# Patient Record
Sex: FEMALE | Race: BLACK OR AFRICAN AMERICAN | NOT HISPANIC OR LATINO | Employment: FULL TIME | URBAN - METROPOLITAN AREA
[De-identification: names, ages, dates, MRNs, and addresses within clinical notes are randomized per-mention and may not be internally consistent; named-entity substitution may affect disease eponyms.]

---

## 2019-12-31 ENCOUNTER — HOSPITAL ENCOUNTER (EMERGENCY)
Facility: HOSPITAL | Age: 29
Discharge: HOME | End: 2019-12-31
Attending: EMERGENCY MEDICINE
Payer: MEDICAID

## 2019-12-31 VITALS
DIASTOLIC BLOOD PRESSURE: 74 MMHG | TEMPERATURE: 98 F | HEART RATE: 82 BPM | HEIGHT: 64 IN | SYSTOLIC BLOOD PRESSURE: 120 MMHG | OXYGEN SATURATION: 100 % | BODY MASS INDEX: 28.04 KG/M2 | WEIGHT: 164.25 LBS | RESPIRATION RATE: 16 BRPM

## 2019-12-31 DIAGNOSIS — L50.9 URTICARIA: Primary | ICD-10-CM

## 2019-12-31 PROCEDURE — 99283 EMERGENCY DEPT VISIT LOW MDM: CPT

## 2019-12-31 RX ORDER — DIPHENHYDRAMINE HCL 25 MG
25 CAPSULE ORAL EVERY 6 HOURS PRN
Qty: 20 CAPSULE | Refills: 0 | Status: SHIPPED | OUTPATIENT
Start: 2019-12-31

## 2019-12-31 RX ORDER — PREDNISONE 50 MG/1
50 TABLET ORAL DAILY
Qty: 5 TABLET | Refills: 0 | Status: SHIPPED | OUTPATIENT
Start: 2019-12-31 | End: 2020-01-05

## 2019-12-31 NOTE — ED PROVIDER NOTES
SCRIBE #1 NOTE: I, Ana Laura Flanagan, am scribing for, and in the presence of, Shen Romero MD. I have scribed the entire note.      History      Chief Complaint   Patient presents with    Allergic Reaction     pt reports she wakes up with hives every morning, pt denies any allergies or changes in her food or soaps       Review of patient's allergies indicates:  No Known Allergies     HPI   HPI    12/31/2019, 12:40 PM   History obtained from the patient      History of Present Illness: Arti Aguilera is a 29 y.o. female patient who presents to the Emergency Department for evaluation of an Allergic Reaction. Patient reports waking up daily with hives on multiple areas of her body. Symptoms are constant and moderate in severity. Patient denies any allergies or changes in detergents/soaps. Patient denies any fever, N/V, trouble swallowing, facial swelling, wheezing, SOB, and all other sxs at this time. Prior Tx includes Benadryl. No further complaints or concerns at this time.       Arrival mode: Personal vehicle    PCP: Primary Doctor No       Past Medical History:  Past Medical History:   Diagnosis Date    Anemia      Past Surgical History:  History reviewed. No pertinent surgical history.    Family History:  Family History   Problem Relation Age of Onset    Hypertension Mother     Cancer Maternal Grandmother        Social History:  Social History     Tobacco Use    Smoking status: Never Smoker   Substance and Sexual Activity    Alcohol use: No    Drug use: No    Sexual activity: Yes     Partners: Male       ROS   Review of Systems   Constitutional: Negative for fever.   HENT: Negative for sore throat and trouble swallowing.    Respiratory: Negative for shortness of breath and wheezing.    Cardiovascular: Negative for chest pain.   Gastrointestinal: Negative for nausea and vomiting.   Genitourinary: Negative for dysuria.   Musculoskeletal: Negative for back pain.   Skin: Negative for rash.        (+)  "Urticaria    Neurological: Negative for weakness.   Hematological: Does not bruise/bleed easily.   All other systems reviewed and are negative.    Physical Exam      Initial Vitals [12/31/19 1238]   BP Pulse Resp Temp SpO2   120/74 82 16 98 °F (36.7 °C) 100 %      MAP       --          Physical Exam  Nursing Notes and Vital Signs Reviewed.  Constitutional: Patient is in no acute distress. Well-developed and well-nourished.  Head: Atraumatic. Normocephalic.  Eyes: PERRL. EOM intact. Conjunctivae are not pale. No scleral icterus.  ENT: Mucous membranes are moist.  Neck: Supple. Full ROM.  Cardiovascular: Regular rate. Regular rhythm. No murmurs, rubs, or gallops. Distal pulses are 2+ and symmetric.  Pulmonary/Chest: No respiratory distress. Clear to auscultation bilaterally. No wheezing or rales.  Abdominal: Soft and non-distended.  There is no tenderness.  Genitourinary: No CVA tenderness  Musculoskeletal: Moves all extremities. No obvious deformities.  Skin: Warm and dry. Diffuse urticaria.    Neurological:  Alert, awake, and appropriate.  Normal speech.  No acute focal neurological deficits are appreciated.  Psychiatric: Normal affect. Good eye contact. Appropriate in content.    ED Course    Procedures  ED Vital Signs:  Vitals:    12/31/19 1238   BP: 120/74   Pulse: 82   Resp: 16   Temp: 98 °F (36.7 °C)   TempSrc: Oral   SpO2: 100%   Weight: 74.5 kg (164 lb 3.9 oz)   Height: 5' 4" (1.626 m)       Abnormal Lab Results:  Labs Reviewed - No data to display     All Lab Results:  None    Imaging Results:  Imaging Results    None                 The Emergency Provider reviewed the vital signs and test results, which are outlined above.    ED Discussion     12:46 PM: Discussed with pt all pertinent ED information and results. Discussed pt dx and plan of tx. Gave pt all f/u and return to the ED instructions. All questions and concerns were addressed at this time. Pt expresses understanding of information and instructions, " and is comfortable with plan to discharge. Pt is stable for discharge.    I discussed with patient and/or family/caretaker that evaluation in the ED does not suggest any emergent or life threatening medical conditions requiring immediate intervention beyond what was provided in the ED, and I believe patient is safe for discharge.  Regardless, an unremarkable evaluation in the ED does not preclude the development or presence of a serious of life threatening condition. As such, patient was instructed to return immediately for any worsening or change in current symptoms.      ED Medication(s):  Medications - No data to display  Current Discharge Medication List      START taking these medications    Details   diphenhydrAMINE (BENADRYL) 25 mg capsule Take 1 capsule (25 mg total) by mouth every 6 (six) hours as needed for Itching.  Qty: 20 capsule, Refills: 0      predniSONE (DELTASONE) 50 MG Tab Take 1 tablet (50 mg total) by mouth once daily. for 5 days  Qty: 5 tablet, Refills: 0             Follow-up Information     [facility] In 2 days.    Contact information:  [address] [facility] [address] [phone]                     Medical Decision Making              Scribe Attestation:   Scribe #1: I performed the above scribed service and the documentation accurately describes the services I performed. I attest to the accuracy of the note.    Attending:   Physician Attestation Statement for Scribe #1: I, Shen Romero MD, personally performed the services described in this documentation, as scribed by Ana Laura Flanagan, in my presence, and it is both accurate and complete.          Clinical Impression       ICD-10-CM ICD-9-CM   1. Urticaria L50.9 708.9       Disposition:   Disposition: Discharged  Condition: Stable         Shen Romero MD  12/31/19 6232

## 2020-01-07 ENCOUNTER — HOSPITAL ENCOUNTER (EMERGENCY)
Facility: HOSPITAL | Age: 30
Discharge: HOME | End: 2020-01-07
Attending: EMERGENCY MEDICINE
Payer: MEDICAID

## 2020-01-07 VITALS
TEMPERATURE: 99 F | SYSTOLIC BLOOD PRESSURE: 124 MMHG | HEIGHT: 63 IN | HEART RATE: 94 BPM | DIASTOLIC BLOOD PRESSURE: 63 MMHG | RESPIRATION RATE: 18 BRPM | OXYGEN SATURATION: 100 % | BODY MASS INDEX: 29.26 KG/M2 | WEIGHT: 165.13 LBS

## 2020-01-07 DIAGNOSIS — L50.9 URTICARIA: Primary | ICD-10-CM

## 2020-01-07 PROCEDURE — 99283 EMERGENCY DEPT VISIT LOW MDM: CPT

## 2020-01-07 RX ORDER — EPINEPHRINE 0.3 MG/.3ML
1 INJECTION SUBCUTANEOUS
Qty: 1 EACH | Refills: 0 | Status: SHIPPED | OUTPATIENT
Start: 2020-01-07 | End: 2021-01-06

## 2020-01-07 RX ORDER — CETIRIZINE HYDROCHLORIDE 10 MG/1
10 TABLET ORAL DAILY
Qty: 30 TABLET | Refills: 0 | Status: SHIPPED | OUTPATIENT
Start: 2020-01-07 | End: 2021-01-06

## 2020-01-07 NOTE — ED PROVIDER NOTES
"Encounter Date: 1/7/2020       History     Chief Complaint   Patient presents with    Urticaria     " going on for 3 weeks it on my butt"     The history is provided by the patient.   Urticaria   This is a recurrent problem. The current episode started yesterday. The problem occurs constantly. The problem has not changed since onset.Pertinent negatives include no chest pain and no shortness of breath.   Recurring hives for past few weeks. Recently treated with steroids, pt states it may be from a certain perfume she wears. Denies any SOB, CP, HA, NVD or any other symptoms at this time.     Review of patient's allergies indicates:  No Known Allergies  Past Medical History:   Diagnosis Date    Anemia      No past surgical history on file.  Family History   Problem Relation Age of Onset    Hypertension Mother     Cancer Maternal Grandmother      Social History     Tobacco Use    Smoking status: Never Smoker   Substance Use Topics    Alcohol use: No    Drug use: No     Review of Systems   Constitutional: Negative for fever.   HENT: Negative for sore throat.    Respiratory: Negative for shortness of breath.    Cardiovascular: Negative for chest pain.   Gastrointestinal: Negative for nausea.   Genitourinary: Negative for dysuria.   Musculoskeletal: Negative for back pain.   Skin: Positive for rash.   Neurological: Negative for weakness.   Hematological: Does not bruise/bleed easily.   All other systems reviewed and are negative.      Physical Exam     Initial Vitals [01/07/20 1357]   BP Pulse Resp Temp SpO2   124/63 94 18 98.8 °F (37.1 °C) 100 %      MAP       --         Physical Exam    Constitutional: She appears well-developed and well-nourished. She is not diaphoretic. No distress.   HENT:   Head: Normocephalic and atraumatic.   Eyes: Conjunctivae and EOM are normal. Pupils are equal, round, and reactive to light.   Neck: Normal range of motion. Neck supple.   Cardiovascular: Normal rate, regular rhythm and " normal heart sounds.   No murmur heard.  Pulmonary/Chest: Breath sounds normal. No respiratory distress. She has no wheezes. She has no rales.   Abdominal: Soft. Bowel sounds are normal. There is no tenderness. There is no rebound and no guarding.   Musculoskeletal: Normal range of motion. She exhibits no edema or tenderness.   Neurological: She is alert and oriented to person, place, and time. No cranial nerve deficit. GCS score is 15. GCS eye subscore is 4. GCS verbal subscore is 5. GCS motor subscore is 6.   Skin: Skin is warm and dry. Capillary refill takes less than 2 seconds. Rash noted. Rash is urticarial.   Urticaria to bilateral buttocks     Psychiatric: She has a normal mood and affect. Thought content normal.         ED Course   Procedures  Labs Reviewed   HIV 1 / 2 ANTIBODY          Imaging Results    None                     I discussed with patient and/or family/caretaker that evaluation in the ED does not suggest any emergent or life threatening medical conditions requiring immediate intervention beyond what was provided in the ED, and I believe patient is safe for discharge.  Regardless, an unremarkable evaluation in the ED does not preclude the development or presence of a serious of life threatening condition. As such, patient was instructed to return immediately for any worsening or change in current symptoms.                      Clinical Impression:       ICD-10-CM ICD-9-CM   1. Urticaria L50.9 708.9                             Nico Arellano Jr., [facility]  01/07/20 0047